# Patient Record
Sex: FEMALE
[De-identification: names, ages, dates, MRNs, and addresses within clinical notes are randomized per-mention and may not be internally consistent; named-entity substitution may affect disease eponyms.]

---

## 2017-05-25 LAB
% IMMATURE GRANULYOCYTES: 0.3 % (ref 0–1.1)
ABSOLUTE IMMATURE GRANULOCYTES: 0.02 10^3/UL (ref 0–0.1)
ABSOLUTE NRBC COUNT: 0 10^3/UL (ref 0–0.01)
ADD DIFF?: NO
ADD MORPH?: NO
ADD SCAN?: NO
ATYPICAL LYMPHOCYTE FLAG: 20 (ref 0–99)
ERYTHROCYTE [DISTWIDTH] IN BLOOD BY AUTOMATED COUNT: 14.1 % (ref 11.5–15.2)
FRAGMENT RBC FLAG: 0 (ref 0–99)
HCT VFR BLD CALC: 42.2 % (ref 38–47)
HGB BLD-MCNC: 13.9 G/DL (ref 12.6–16.3)
LEFT SHIFT FLG: 0 (ref 0–99)
LIPEMIA HEMOLYSIS FLAG: 80 (ref 0–99)
MCH RBC BLDCO QN: 28.3 PG (ref 27.9–34.1)
MCHC RBC AUTO-ENTMCNC: 32.9 G/DL (ref 32.4–36.7)
MCV RBC AUTO: 85.9 FL (ref 81.5–99.8)
NRBC-AUTO%: 0 % (ref 0–0.2)
PLATELET # BLD: 247 10^3/UL (ref 150–400)
PLATELET CLUMPS FLAG: 0 (ref 0–99)
PMV BLD AUTO: 10.2 FL (ref 8.7–11.7)
RBC # BLD AUTO: 4.91 10^6/UL (ref 4.18–5.33)

## 2017-06-07 ENCOUNTER — HOSPITAL ENCOUNTER (INPATIENT)
Dept: HOSPITAL 80 - F3N | Age: 67
LOS: 1 days | Discharge: HOME | DRG: 470 | End: 2017-06-08
Attending: ORTHOPAEDIC SURGERY | Admitting: ORTHOPAEDIC SURGERY
Payer: COMMERCIAL

## 2017-06-07 DIAGNOSIS — M17.12: Primary | ICD-10-CM

## 2017-06-07 PROCEDURE — 0SRD0J9 REPLACEMENT OF LEFT KNEE JOINT WITH SYNTHETIC SUBSTITUTE, CEMENTED, OPEN APPROACH: ICD-10-PCS | Performed by: ORTHOPAEDIC SURGERY

## 2017-06-07 PROCEDURE — C1713 ANCHOR/SCREW BN/BN,TIS/BN: HCPCS

## 2017-06-07 RX ADMIN — ASPIRIN SCH MG: 325 TABLET, FILM COATED ORAL at 20:53

## 2017-06-07 RX ADMIN — FAMOTIDINE SCH MG: 20 TABLET, FILM COATED ORAL at 20:53

## 2017-06-07 RX ADMIN — ACETAMINOPHEN SCH MG: 325 TABLET ORAL at 17:57

## 2017-06-07 RX ADMIN — ACETAMINOPHEN SCH MG: 325 TABLET ORAL at 13:22

## 2017-06-07 RX ADMIN — Medication SCH MLS: at 16:31

## 2017-06-07 RX ADMIN — DOCUSATE SODIUM AND SENNOSIDES SCH TAB: 50; 8.6 TABLET ORAL at 20:53

## 2017-06-07 NOTE — POSTOPPROG
Post Op Note


Date of Operation: 06/07/17


Surgeon: YOLA Crocker


Assistant: shivam crocker


Anesthesiologist: dr. recio


Anesthesia: Spinal, Other (Specify) (adductor canal block)


Pre-op Diagnosis: Left knee OA


Post-op Diagnosis: same


Indication: left knee pain due to OA that failed conservative measures


Procedure: L TKA


Findings: severe knee OA


Inf/Abcess present in the surg proc area at time of surgery?: No


EBL:

## 2017-06-08 VITALS
RESPIRATION RATE: 15 BRPM | DIASTOLIC BLOOD PRESSURE: 88 MMHG | TEMPERATURE: 98.5 F | SYSTOLIC BLOOD PRESSURE: 137 MMHG | HEART RATE: 76 BPM

## 2017-06-08 VITALS — OXYGEN SATURATION: 94 %

## 2017-06-08 LAB
HCT VFR BLD CALC: 35.5 % (ref 38–47)
HGB BLD-MCNC: 11.9 G/DL (ref 12.6–16.3)

## 2017-06-08 RX ADMIN — Medication SCH MLS: at 00:22

## 2017-06-08 RX ADMIN — ASPIRIN SCH MG: 325 TABLET, FILM COATED ORAL at 08:41

## 2017-06-08 RX ADMIN — ACETAMINOPHEN SCH MG: 325 TABLET ORAL at 05:40

## 2017-06-08 RX ADMIN — FAMOTIDINE SCH MG: 20 TABLET, FILM COATED ORAL at 08:41

## 2017-06-08 RX ADMIN — ACETAMINOPHEN SCH MG: 325 TABLET ORAL at 00:22

## 2017-06-08 RX ADMIN — DOCUSATE SODIUM AND SENNOSIDES SCH TAB: 50; 8.6 TABLET ORAL at 08:43

## 2017-06-08 NOTE — SOAPPROG
SOAP Progress Note


Assessment/Plan: 


Assessment:





Patient is doing well POD 1 s/p L TKA


Pain management: pain is well controlled on oral pain meds.


VTE ppx: recommend aspirin daily for 3 weeks, cont CRISTEL and SCDs


Anemia: level is expected initially postop. Asymptomatic. Continue to monitor 


D/c planning: d/c to home today pending release from PT.


Patient states she does not need home health and that her sister can drive her 

to outpatient PT




















Plan:





06/08/17 08:19





06/08/17 08:19





Subjective: 





Ade is doing well this morning. denies SOB, chest pain and N.V.  


Objective: 





 Vital Signs











Temp Pulse Resp BP Pulse Ox


 


 36.9 C   76   15   137/88 H  94 


 


 06/08/17 07:55  06/08/17 07:55  06/08/17 07:55  06/08/17 07:55  06/08/17 04:16








 Laboratory Results





 06/08/17 04:48 





 











 06/07/17 06/08/17 06/09/17





 05:59 05:59 05:59


 


Intake Total  2916 


 


Output Total  1555 


 


Balance  1361 








LLE: incision dressing is clean and dry, NVI, +pf/df





ICD10 Worksheet


Patient Problems: 


 Problems











Problem Status Onset


 


Primary localized osteoarthritis of left knee Acute

## 2017-06-08 NOTE — GOP
[f rep st]



                                                                OPERATIVE REPORT





DATE OF OPERATION:  06/07/2017



SURGEON:  DOM Curtis MD



ASSISTANT:  Flaca Curtis PA-C.



ANESTHESIA:  Spinal.



PREOPERATIVE DIAGNOSIS:  Left knee osteoarthritis.



POSTOPERATIVE DIAGNOSIS:  Left knee osteoarthritis.



PROCEDURE PERFORMED:  Left total knee arthroplasty.



FINDINGS:  



ESTIMATED BLOOD LOSS:  30 ml



INDICATIONS:  This is a 66-year-old female with severe and progressive pain and deformity of the lef
t knee unresponsive to conservative care. Risks and benefits of the surgical intervention were expla
ined in detail.



DESCRIPTION OF PROCEDURE:  The patient was brought to the operative room and placed on the table in 
the supine position. Spinal anesthesia was induced without difficulty. A pneumatic tourniquet was ap
plied about the left proximal thigh, and the leg was prepped and draped in a sterile fashion. The le
g george was applied. After exsanguination by elevation the tourniquet was inflated to 275 mm of clari
cury. 



Incision was made anterior medial from the tibial tuberosity to a point 2 cm proximal to the superio
r pole of the patella. Medial parapatellar arthrotomy was carried out from the superior pole of the 
patella and posteriorly in line with the fibers of the Type 2 VMO. The medial collateral ligament wa
s elevated and the infrapatellar fat pad was resected. 



The patella was everted and the articular surface was excised. A 35 mm patellar button was placed. T
he distal femoral guide hole was drilled and the 6 degree alignment sher was placed. A 10 mm distal f
emoral cut was made without difficulty. 



Attention was turned to the tibia and a standard 6 mm cut based on the medial tibial condyle was per
formed. The tibial articular surface was excised without difficulty. 



Attention was turned back to the femur and a size 4 Triathlon femoral cutting block was positioned. 
Anterior, posterior, and chamfer cuts were made, followed by the intercondylar box cut. 



The knee was extended and the remnants of the medial and lateral meniscus were excised. The posterio
r capsule was injected with ropivacaine, epinephrine and Toradol. A size 4 MIS mini-keel tibial tray
 was positioned. Trial reduction was then carried out. There was excellent range of motion, alignmen
t, and stability using the 11 mm polyethylene. 



All trials were then removed. The joint was thoroughly irrigated and carefully dried. Two packages o
f cement and 2 grams of vancomycin were mixed in the vacuum mixer and placed on the fixation surface
s of all surfaces of the components. The components were implanted and all excess cement was thoroug
hly removed. The permanent 11 mm polyethylene X3 was placed without difficulty. 



The tourniquet was deflated and all bleeders were coagulated. The wound was thoroughly irrigated and
 closed using interrupted sutures of 2-0 Vicryl for the joint capsule. The subcu was closed with 3-0
 Vicryl and the skin with 4-0 Monocryl. Dermabond and Steri-Strips were applied followed by a compre
ssive dressing. The patient was then moved from the operating room to the recovery room in good cond
ition, having tolerated the procedure well.



PATHOLOGY:  Severe patellofemoral and lateral osteoarthritis.





Job #:  [id number]

## 2017-09-27 ENCOUNTER — HOSPITAL ENCOUNTER (INPATIENT)
Dept: HOSPITAL 80 - F3N | Age: 67
LOS: 1 days | Discharge: HOME | DRG: 470 | End: 2017-09-28
Attending: ORTHOPAEDIC SURGERY | Admitting: ORTHOPAEDIC SURGERY
Payer: COMMERCIAL

## 2017-09-27 DIAGNOSIS — Z96.652: ICD-10-CM

## 2017-09-27 DIAGNOSIS — M17.11: Primary | ICD-10-CM

## 2017-09-27 PROCEDURE — C1713 ANCHOR/SCREW BN/BN,TIS/BN: HCPCS

## 2017-09-27 PROCEDURE — 0SRC0J9 REPLACEMENT OF RIGHT KNEE JOINT WITH SYNTHETIC SUBSTITUTE, CEMENTED, OPEN APPROACH: ICD-10-PCS | Performed by: ORTHOPAEDIC SURGERY

## 2017-09-27 RX ADMIN — ACETAMINOPHEN SCH MG: 325 TABLET ORAL at 18:11

## 2017-09-27 RX ADMIN — DOCUSATE SODIUM AND SENNOSIDES SCH TAB: 50; 8.6 TABLET ORAL at 22:00

## 2017-09-27 RX ADMIN — Medication SCH MLS: at 15:38

## 2017-09-27 RX ADMIN — Medication SCH MLS: at 23:40

## 2017-09-27 RX ADMIN — ACETAMINOPHEN SCH MG: 325 TABLET ORAL at 23:40

## 2017-09-27 RX ADMIN — ASPIRIN SCH MG: 325 TABLET, FILM COATED ORAL at 22:00

## 2017-09-27 RX ADMIN — ACETAMINOPHEN SCH MG: 325 TABLET ORAL at 12:11

## 2017-09-27 RX ADMIN — FAMOTIDINE SCH MG: 20 TABLET, FILM COATED ORAL at 22:04

## 2017-09-27 NOTE — PDANEPAE
ANE History of Present Illness





Patient presents for R TKA





ANE Past Medical History





- Cardiovascular History


Hx Hypertension: No


Hx Arrhythmias: No


Hx Chest Pain: No


Hx Coronary Artery / Peripheral Vascular Disease: No


Hx CHF / Valvular Disease: No


Hx Palpitations: No


Cardiovascular History Comment: PVC'S





- Pulmonary History


Hx COPD: No


Hx Asthma/Reactive Airway Disease: No


Hx Recent Upper Respiratory Infection: No


Hx Oxygen in Use at Home: No


Hx Sleep Apnea: No


Sleep Apnea Screening Result - Last Documented: Negative





- Neurologic History


Hx Cerebrovascular Accident: No


Hx Seizures: No


Hx Dementia: No





- Endocrine History


Hx Diabetes: No





- Renal History


Hx Renal Disorders: No





- Liver History


Hx Hepatic Disorders: No





- Neurological & Psychiatric Hx


Hx Neurological and Psychiatric Disorders: No





- Cancer History


Hx Cancer: No





- Congenital Disorder History


Hx Congenital Disorders: No





- GI History


Hx Gastrointestinal Disorders: No





- Chronic Pain History


Chronic Pain: Yes (LEFT HIP, BOTH KNEES AND LOWER BACK)





- Surgical History


Prior Surgeries: D&C IN HER 20'S.  APPENDECTOMY & TONSILLECTOMY AS A KID





ANE Review of Systems


Review of Systems: 








- Exercise capacity


METS (RN): 5 METS





ANE Patient History





- Allergies


Allergies/Adverse Reactions: 








hydrocodone Allergy (Severe, Verified 09/27/17 07:33)


 Abdominal Pain


Sulfa (Sulfonamide Antibiotics) Allergy (Intermediate, Verified 04/28/17 13:50)


 Rash








- Home Medications


Home medications: home medication list seen and reviewed


Home Medications: 








Cholecalciferol Vit D3 [Vitamin D3 (*)] 2,000 units PO HS 04/28/17 [Last Taken 

09/13/17]


Cholecalciferol Vit D3 [Vitamin D3 (*)] 4,000 units PO DAILY 04/28/17 [Last 

Taken 09/13/17]


Herbals/Supplements -Info Only 1 ea PO DAILY 04/28/17 [Last Taken 09/13/17]


Multivitamins [Multivitamin (*)] 1 tab PO DAILY 05/05/17 [Last Taken 09/13/17]


Aspirin EC [Aspirin EC 81 mg (*)] 81 mg PO HS 09/05/17 [Last Taken 09/20/17]


Naproxen Sodium [Aleve 220 MG (*)] 220 mg PO BID 09/05/17 [Last Taken 09/20/17]


Omega-3 Fatty Acids [Fish Oil 1000 mg (*)] 1,000 mg PO BID 09/05/17 [Last Taken 

09/13/17]








- NPO status


NPO Status: no food or drink >8 hours





- Anes Hx


Anes Hx: no prior problems





- Smoking Hx


Smoking Status: Former smoker





- Family Anes Hx


Family Hx Anesthesia Complications: none





ANE Labs/Vital Signs





- Vital Signs


Height: 177.8 cm


Weight: 71.214 kg





ANE Physical Exam





- Airway


Neck exam: FROM, decreased ROM


Mallampati Score: Class 2


Mouth exam: normal dental/mouth exam





- Pulmonary


Pulmonary: no respiratory distress





- Cardiovascular


Cardiovascular: regular rate and rhythym





- ASA Status


ASA Status: II





ANE Anesthesia Plan


Anesthesia Plan: spinal


Regional Anesthesia: single shot NB (RBA discussed)

## 2017-09-27 NOTE — POSTOPPROG
Post Op Note


Date of Operation: 09/27/17


Surgeon: YOLA Crocker


Assistant: shivam crocker


Anesthesiologist: dr. mills


Anesthesia: Spinal, Other (Specify) (adductor canal block)


Pre-op Diagnosis: right knee OA


Post-op Diagnosis: same


Indication: right knee pain due to OA that failed conservative measures


Procedure: R TKA 


Findings: severe knee OA


Inf/Abcess present in the surg proc area at time of surgery?: No


EBL:

## 2017-09-28 VITALS — HEART RATE: 60 BPM | OXYGEN SATURATION: 97 %

## 2017-09-28 VITALS — RESPIRATION RATE: 18 BRPM | TEMPERATURE: 98.4 F | DIASTOLIC BLOOD PRESSURE: 60 MMHG | SYSTOLIC BLOOD PRESSURE: 119 MMHG

## 2017-09-28 LAB
HCT VFR BLD CALC: 33.4 % (ref 38–47)
HGB BLD-MCNC: 11.1 G/DL (ref 12.6–16.3)

## 2017-09-28 RX ADMIN — ASPIRIN SCH MG: 325 TABLET, FILM COATED ORAL at 09:36

## 2017-09-28 RX ADMIN — HYDROMORPHONE HYDROCHLORIDE PRN MG: 2 TABLET ORAL at 09:37

## 2017-09-28 RX ADMIN — FAMOTIDINE SCH MG: 20 TABLET, FILM COATED ORAL at 09:37

## 2017-09-28 RX ADMIN — ACETAMINOPHEN SCH MG: 325 TABLET ORAL at 05:29

## 2017-09-28 RX ADMIN — HYDROMORPHONE HYDROCHLORIDE PRN MG: 2 TABLET ORAL at 05:30

## 2017-09-28 RX ADMIN — DOCUSATE SODIUM AND SENNOSIDES SCH TAB: 50; 8.6 TABLET ORAL at 09:36

## 2017-09-28 NOTE — GOP
[f 
rep st]



                                                                OPERATIVE REPORT





DATE OF OPERATION:  09/27/2017



SURGEON:  DOM Curtis MD



ASSISTANT:  Flaca Curtis PA-C



ANESTHESIA:  Spinal.



PREOPERATIVE DIAGNOSIS:  Right knee osteoarthritis.



POSTOPERATIVE DIAGNOSIS:  Right knee osteoarthritis.



PROCEDURE PERFORMED:  Right total knee arthroplasty.



FINDINGS/PATHOLOGY:   Severe lateral and patellofemoral osteoarthritis.  



ESTIMATED BLOOD LOSS:  30 cc.



INDICATIONS:  This is a 66-year-old female with severe and progressive pain and 
deformity of the right knee unresponsive to conservative care.  Risks and 
benefits of the surgical intervention were explained in detail.



DESCRIPTION OF PROCEDURE:  The patient was brought to the operative room and 
placed on the table in the supine position.  Spinal anesthesia was induced 
without difficulty.  A pneumatic tourniquet was applied about the right 
proximal thigh, and the leg was prepped and draped in a sterile fashion.  The 
leg george was applied.  After exsanguination by elevation the tourniquet was 
inflated to 250 mm of mercury. 



Incision was made anterior medial from the tibial tuberosity to a point 2 cm 
proximal to the superior pole of the patella.  Medial parapatellar arthrotomy 
was carried out from the superior pole of the patella and posteriorly in line 
with the fibers of the Type II VMO.  The medial collateral ligament was 
elevated and the infrapatellar fat pad was resected. 



The patella was everted and the articular surface was excised.  A 35 mm 
patellar button was placed. The distal femoral guide hole was drilled and the 6-
degree alignment sher was placed.  A 10 mm distal femoral cut was made without 
difficulty. 



Attention was turned to the tibia and a standard 9 mm cut based on the lateral 
tibial condyle was performed.  The tibial articular surface was excised without 
difficulty. 



Attention was turned back to the femur and a size 4 Triathlon femoral cutting 
block was positioned.  Anterior, posterior, and chamfer cuts were made, 
followed by the intercondylar box cut. 



The knee was extended and the remnants of the medial and lateral meniscus were 
excised.  The posterior capsule was injected with ropivacaine, epinephrine and 
Toradol.  A size 4 MIS mini keel tibial tray was positioned.  Trial reduction 
was then carried out.  There was excellent range of motion, alignment, and 
stability using the 9 mm polyethylene. 



All trials were then removed.  The joint was thoroughly irrigated and carefully 
dried.  Two packages of cement and 2 grams of vancomycin were mixed in the 
vacuum mixer and placed on the fixation surfaces of all surfaces of the 
components.  The components were implanted and all excess cement was thoroughly 
removed.  The permanent 9 mm polyethylene was placed without difficulty.  



The tourniquet was deflated and all bleeders were coagulated.  The wound was 
thoroughly irrigated and closed using interrupted sutures of 2-0 Vicryl for the 
joint capsule.  The subcu was closed with 3-0 Vicryl and the skin with 4-0 
Monocryl.  Dermabond and Steri-Strips were applied followed by a compressive 
dressing.  The patient was then moved from the operating room to the recovery 
room in good condition, having tolerated the procedure well.





Job #:  [id number]

MTDD

## 2017-09-28 NOTE — SOAPPROG
SOAP Progress Note


Assessment/Plan: 


Assessment:





[place] is doing well today POD 1 s/p R TKA





1) pain management: pain is well controlled on oral pain meds


2) VTE ppx: recommend aspirin daily for 3 weeks and continue SCDs and CRISTEL hose


3) Anemia: level expected initially postop, asymptomatic, continue to monitor 

for symptoms


4) D/c planning: d/c to home today pending release from PT




















Plan:





09/28/17 09:54





Subjective: 





patient is doing well today, denies SOB, chest pain and N/V


Objective: 





 Vital Signs











Temp Pulse Resp BP Pulse Ox


 


 36.9 C   60   18   119/60   97 


 


 09/28/17 07:53  09/28/17 07:53  09/28/17 07:53  09/28/17 07:53  09/28/17 07:53








 Laboratory Results





 09/28/17 05:29 





 











 09/27/17 09/28/17 09/29/17





 05:59 05:59 05:59


 


Intake Total  3210 


 


Output Total  1850 


 


Balance  1360 








RLE: incision dressing is clean and dry, NVI, +pf/df





ICD10 Worksheet


Patient Problems: 


 Problems











Problem Status Onset


 


Primary localized osteoarthritis of right knee Acute

## 2017-09-28 NOTE — ASDISCHSUM
----------------------------------------------

Discharge Information

----------------------------------------------

Plan Status:Home with No Needs                       Medically Cleared to Leave:

Discharge Date:09/28/2017 11:27 AM                   CM D/C Disposition:Home, Routine, Self-Care

ADT D/C Disposition:Home, Routine, Self-Care         Projected Discharge Date:09/28/2017 11:27 AM

Transportation at D/C:                               Discharge Delay Reason:

Follow-Up Date:09/28/2017 11:27 AM                   Discharge Slot:

Final Diagnosis:

----------------------------------------------

Placement Information

----------------------------------------------

----------------------------------------------

Patient Contact Information

----------------------------------------------

Contact Name:IMER                              Relationship:Sister

Address:                                             Home [phone]

                                                     Work Phone:

City:                                                [facility] Phone:

State/Zip Code:                                      Email:

----------------------------------------------

Financial Information

----------------------------------------------

Financial Class:

Primary Plan Desc:MEDICARE INPATIENT                 Primary Plan Number:[id number]

Secondary Plan Desc:[employer] PPO HMO OPEN ACC LOCAL     Secondary Plan Number:[id number]

 

 

----------------------------------------------

Assessment Information

----------------------------------------------

----------------------------------------------

Intervention Information

----------------------------------------------

## 2017-09-28 NOTE — GDS
[f rep st]



                                                             DISCHARGE SUMMARY





ADMISSION DIAGNOSIS:  Right knee osteoarthritis.



DISCHARGE DIAGNOSIS:  Right knee osteoarthritis.



PROCEDURE:  Right total knee arthroplasty.



VTE PROPHYLAXIS:  Aspirin recommended 3 weeks daily.



BRIEF DESCRIPTION OF HOSPITAL STAY:  Patient was admitted for an elective joint arthroplasty.  The pa
tient tolerated the procedure well and has passed physical therapy.  The patient was given appropriat
e antibiotic prophylaxis and venous thromboembolism prophylaxis.  The patient's pain was well control
led on oral pain medication, patient was holding down food, and had urinated.  Decision was made to d
ischarge the patient.  The patient was given post-operative prescriptions pre-operatively.



PLAN:  Please follow up as scheduled at Dr. Curtis office in 3 weeks.





Job #:  [id number]

## 2018-02-14 ENCOUNTER — HOSPITAL ENCOUNTER (INPATIENT)
Dept: HOSPITAL 80 - F3N | Age: 68
LOS: 15 days | Discharge: HOME | DRG: 470 | End: 2018-03-01
Attending: ORTHOPAEDIC SURGERY | Admitting: ORTHOPAEDIC SURGERY
Payer: COMMERCIAL

## 2018-02-14 DIAGNOSIS — M16.12: Primary | ICD-10-CM

## 2018-02-14 DIAGNOSIS — Z96.653: ICD-10-CM

## 2018-02-28 PROCEDURE — 0SRB04A REPLACEMENT OF LEFT HIP JOINT WITH CERAMIC ON POLYETHYLENE SYNTHETIC SUBSTITUTE, UNCEMENTED, OPEN APPROACH: ICD-10-PCS | Performed by: ORTHOPAEDIC SURGERY

## 2018-02-28 RX ADMIN — Medication SCH MLS: at 17:05

## 2018-02-28 RX ADMIN — Medication PRN MCG: at 11:38

## 2018-02-28 RX ADMIN — ACETAMINOPHEN SCH MG: 325 TABLET ORAL at 12:55

## 2018-02-28 RX ADMIN — SODIUM CHLORIDE, SODIUM LACTATE, POTASSIUM CHLORIDE, AND CALCIUM CHLORIDE SCH MLS: 600; 310; 30; 20 INJECTION, SOLUTION INTRAVENOUS at 18:07

## 2018-02-28 RX ADMIN — DOCUSATE SODIUM AND SENNOSIDES SCH: 50; 8.6 TABLET ORAL at 21:39

## 2018-02-28 RX ADMIN — Medication PRN MCG: at 11:24

## 2018-02-28 RX ADMIN — FAMOTIDINE SCH MG: 20 TABLET, FILM COATED ORAL at 21:14

## 2018-02-28 RX ADMIN — ACETAMINOPHEN SCH MG: 325 TABLET ORAL at 19:00

## 2018-02-28 RX ADMIN — ASPIRIN 81 MG SCH MG: 81 TABLET ORAL at 21:14

## 2018-02-28 NOTE — PDANEPAE
ANE History of Present Illness





gabbi





ANE Past Medical History





- Cardiovascular History


Hx Hypertension: No


Hx Arrhythmias: No


Hx Chest Pain: No


Hx Coronary Artery / Peripheral Vascular Disease: No


Hx CHF / Valvular Disease: No


Hx Palpitations: No


Cardiovascular History Comment: OCCASIONAL PVC'S





- Pulmonary History


Hx COPD: No


Hx Asthma/Reactive Airway Disease: No


Hx Recent Upper Respiratory Infection: No


Hx Oxygen in Use at Home: No


Hx Sleep Apnea: No


Sleep Apnea Screening Result - Last Documented: Negative





- Neurologic History


Hx Cerebrovascular Accident: No


Hx Seizures: No


Hx Dementia: No





- Endocrine History


Hx Diabetes: No





- Renal History


Hx Renal Disorders: No





- Liver History


Hx Hepatic Disorders: No





- Neurological & Psychiatric Hx


Hx Neurological and Psychiatric Disorders: No





- Cancer History


Hx Cancer: No





- Congenital Disorder History


Hx Congenital Disorders: No





- GI History


Hx Gastrointestinal Disorders: No





- Other Health History


Other Health History: DDD.  OSTEOARTHRITIS





- Chronic Pain History


Chronic Pain: Yes (LEFT HIP, BOTH KNEES AND LOWER BACK)





- Surgical History


Prior Surgeries: RT TOTAL KNEE 9/2017.  TRACY TOTAL KNEE'S.  D&C IN HER 20'S.  T&A





ANE Review of Systems


Review of Systems: 








- Exercise capacity


METS (RN): 4 METS





ANE Patient History





- Allergies


Allergies/Adverse Reactions: 








hydrocodone Allergy (Severe, Verified 09/27/17 07:33)


 Abdominal Pain


Sulfa (Sulfonamide Antibiotics) Allergy (Intermediate, Verified 04/28/17 13:50)


 Rash


celecoxib [From Celebrex] Allergy (Verified 01/25/18 10:28)


 SEVERE NAUSEA








- Home Medications


Home Medications: 








Cholecalciferol Vit D3 [Vitamin D3 (*)] 2,000 units PO HS 04/28/17 [Last Taken 

02/21/18]


Cholecalciferol Vit D3 [Vitamin D3 (*)] 4,000 units PO DAILY 04/28/17 [Last 

Taken 02/21/18]


Herbals/Supplements -Info Only 1 ea PO DAILY 04/28/17 [Last Taken 02/21/18]


Omega-3 Fatty Acids [Fish Oil 1000 mg (*)] 1,000 mg PO BID 09/05/17 [Last Taken 

02/21/18]


Aspirin [Aspirin 81mg (*)] 81 mg PO HS 01/18/18 [Last Taken 02/21/18]








- NPO status


NPO Status: no food or drink >8 hours


NPO Since - Liquids (Date): 02/28/18


NPO Since - Liquids (Time): 07:00


NPO Since - Solids (Date): 02/27/18


NPO Since - Solids (Time): 22:00





- Smoking Hx


Smoking Status: Former smoker





- Family Anes Hx


Family Hx Anesthesia Complications: none





ANE Labs/Vital Signs





- Vital Signs


Blood Pressure: 126/81


Heart Rate: 68


Respiratory Rate: 14


O2 Sat (%): 95


Height: 175.26 cm


Weight: 70.307 kg





ANE Physical Exam





- Airway


Mallampati Score: Class 2


Mouth exam: normal dental/mouth exam





- Cardiovascular


Cardiovascular: regular rate and rhythym





- ASA Status


ASA Status: II





ANE Anesthesia Plan


Anesthesia Plan: spinal

## 2018-02-28 NOTE — POSTOPPROG
Post Op Note


Date of Operation: 02/28/18


Surgeon: YOLA Crocker


Assistant: shivam crocker


Anesthesiologist: dr. michelle


Anesthesia: Spinal


Pre-op Diagnosis: left hip OA


Post-op Diagnosis: same


Indication: left hip pain due to OA that failed conservative measures


Procedure: L JOSEFA ant approach


Findings: severe hip OA


Inf/Abcess present in the surg proc area at time of surgery?: No


EBL: [id number]

## 2018-02-28 NOTE — GOP
[f rep st]



                                                                OPERATIVE REPORT





DATE OF OPERATION:  02/28/2018



SURGEON:  DOM Curtis MD



ASSISTANT:  RADHIKA Mario



ANESTHESIA:  Spinal.



PREOPERATIVE DIAGNOSIS:  Left hip ostearthritis.



POSTOPERATIVE DIAGNOSIS:  Left hip osteoarthritis.



PROCEDURE PERFORMED:  Left total hip arthroplasty with x-ray.



FINDINGS:  





ESTIMATED BLOOD LOSS:  200 cc.



INDICATIONS:  The patient has progressively worsening arthritis of the hip which has failed medical m
anagement.  The patient understands the treatment options including continued non-operative care and 
has selected surgical intervention.  The patient has decided to undergo total hip arthroplasty via th
e direct anterior approach, understanding the risks of the procedure including, but not limited to, n
eurovascular injury, infection, persistent pain, component wear and loosening, deep venous thrombosis
, pulmonary embolism, limb length inequality, hip instability (including dislocation), and intra-oper
ative fractures.



DESCRIPTION OF PROCEDURE:  After proper identification of the patient including verification and belen
ing the surgical site, the patient was brought to the operating room and placed in the supine positio
n.  All bony prominences were well padded.  Anesthesia was induced without complication and intraveno
us prophylactic antibiotics were administered prior to skin incision. 



The operative leg was placed in the Trumpf Arch table extension and the well leg in a [place] leg ho
lder.  The patient was prepped and draped in the usual sterile fashion.  The C-arm was draped for int
ra-operative fluoroscopy to check acetabular position, femoral component position including leg lengt
h and femoral offset. 



Attention was then drawn to surgical exposure of the hip.  An incision was made with a #10 [place]
r blade starting 3 cm lateral and 3 cm distal to the anterior superior iliac spine measuring 8-10 cm 
and coursing distally toward the greater trochanter.  The skin and subcutaneous tissues were divided 
sharply down to the fascia leann.  The fascia leann was incised in line with the skin incision exposing
 the underlying tensor fascia leann muscle.  The muscle was bluntly elevated from the fascia and the f
irst extracapsular Cobra retractor was placed laterally at the junction of the superior femoral neck 
and greater trochanter.  The lateral femoral circumflex vessels were identified, cauterized, and divi
ded with the Aquamantys bipolar cautery.  The deep investing fascia of the TFL was divided to allow p
prabhu mobilization of the muscle preventing damage during the retraction.  The reflected head of the 
rectus femoris muscle was elevated off the anterior hip capsule and a medial Cobra retractor was plac
ed just proximal to the lesser trochanter. 



The anterior capsulotomy was made sharply from the superolateral acetabulum to the saddle junction of
 the superior femoral neck and greater trochanter, then coursing inferomedial towards the lesser troc
hanter.  The retractors were then placed in the intracapsular position for femoral neck osteotomy.  C
orresponding to pre-operative templating, the osteotomy was made with the oscillating saw carefully p
rotecting the greater trochanter and soft tissues.  The femoral head was removed from the acetabulum 
with a corkscrew and confirmed to be severely arthritic with exposed bone, deformity and osteophytes.
  Similar findings were confirmed in the acetabulum.  The Arch table extension was then placed in 40 
degrees external rotation. 



Attention was then drawn to the acetabular preparation.  After placement of the anterior and posterio
r Cobra retractors outside the labrum and intracapsular, the circumferential labrum was removed sharp
ly.  The foveal contents were then removed and hemostasis obtained with cautery. 



The first reamer selected was sized using the removed femoral head.  Reaming began with medialization
 and then commenced in 2 mm increments at 45 degrees of abduction and 15 degrees of anteversion using
 fluoroscopic navigation.  Reaming ceased 1 mm less than the definitive acetabular component and cheryl
esponded to the pre-operative templating.  The final acetabular component was inserted using fluorosc
opy to achieve proper orientation yielding excellent purchase and stability in the acetabulum.  The f
inal acetabular liner was then placed and its seating confirmed. 



Attention was then turned to the femur.  The Arch table extension was placed in extension and adducti
on, delivering the osteotomized femoral neck into the wound.  A 2-pronged femoral elevator was placed
 at the calcar and another at the tip of the greater trochanter.  The posterolateral capsule was rele
ased with cautery allowing mobilization of the femur lateral and anterior for preparation.  The exter
nal rotators were visualized and preserved.  A curette and rongeur were used to open the starting poi
nt for broaching.  Serial broaching started with the #0 broach and ended with the broach that exhibit
ed excellent fit in the proximal femur.  A change in pitch during mallet strikes was accompanied by t
he inability to advance the broach any further.  The trial reduction was performed and fluoroscopic n
avigation was utilized to check limb length.  Adjustments were made to equalize limb length according
ly. 



After the final trials were accepted they were removed and the wound was copiously lavaged.  The femo
ral component was seated to the same depth as the final broach and the femoral head was impacted onto
 the clean trunnion.  The hip was then reduced for the final time and once more fluoroscopy was used 
to check that limb length equality was achieved. 



The wound was irrigated and closed in layers, the fascia leann with 2-0 Quill, the subcutaneous tissue
 with 2-0 Quill, and the skin with Dermabond.  Sterile dressings were applied.  Final sharps and spon
ge counts were accurate.  The patient was then transferred to a hospital bed and brought to the [facility] [address] [facility] room in stable condition.



IMPLANTS:  Accolade II size 5 at 132.  Acetabular component 1 Trident II 54 mm.  The liner is a Tride
nt X3 at 36 mm.  The head is a Biolox Delta 36 mm -2.5.





Job #:  [id number]

## 2018-03-01 VITALS
RESPIRATION RATE: 16 BRPM | OXYGEN SATURATION: 94 % | SYSTOLIC BLOOD PRESSURE: 108 MMHG | HEART RATE: 85 BPM | DIASTOLIC BLOOD PRESSURE: 84 MMHG | TEMPERATURE: 98.1 F

## 2018-03-01 RX ADMIN — ASPIRIN 81 MG SCH MG: 81 TABLET ORAL at 08:03

## 2018-03-01 RX ADMIN — DOCUSATE SODIUM AND SENNOSIDES SCH: 50; 8.6 TABLET ORAL at 08:04

## 2018-03-01 RX ADMIN — ACETAMINOPHEN SCH MG: 325 TABLET ORAL at 05:43

## 2018-03-01 RX ADMIN — Medication SCH MLS: at 00:11

## 2018-03-01 RX ADMIN — ACETAMINOPHEN SCH MG: 325 TABLET ORAL at 00:10

## 2018-03-01 RX ADMIN — SODIUM CHLORIDE, SODIUM LACTATE, POTASSIUM CHLORIDE, AND CALCIUM CHLORIDE SCH MLS: 600; 310; 30; 20 INJECTION, SOLUTION INTRAVENOUS at 03:48

## 2018-03-01 RX ADMIN — FAMOTIDINE SCH MG: 20 TABLET, FILM COATED ORAL at 08:04

## 2018-03-01 NOTE — SOAPPROG
SOAP Progress Note


Assessment/Plan: 


Assessment:





patient is doing well POD 1 s/p L JOSEFA





pain is well controlled


DVT ppx: recommend aspirin 81 mg BID


Anemia: level expected initially postop


d/c planning: d/c to home today




















Plan:





03/01/18 15:22





Subjective: 





patient is doing well, denies SOB, chest pain and N/V.


Objective: 





 Vital Signs











Temp Pulse Resp BP Pulse Ox


 


 36.7 C   85   16   108/84 H  94 


 


 03/01/18 08:14  03/01/18 08:14  03/01/18 08:14  03/01/18 08:14  03/01/18 08:14








 Laboratory Results





 03/01/18 05:01 





 











 02/28/18 03/01/18 03/02/18





 05:59 05:59 05:59


 


Intake Total  2881 


 


Output Total  550 


 


Balance  2331 








LLE; incision dressing is clean and dry, NVI, +pf/df





ICD10 Worksheet


Patient Problems: 


 Problems











Problem Status Onset


 


Primary localized osteoarthritis of left hip Acute  


 


Primary localized osteoarthritis of right knee Acute